# Patient Record
Sex: MALE | Race: WHITE | Employment: FULL TIME | URBAN - METROPOLITAN AREA
[De-identification: names, ages, dates, MRNs, and addresses within clinical notes are randomized per-mention and may not be internally consistent; named-entity substitution may affect disease eponyms.]

---

## 2021-04-24 ENCOUNTER — HOSPITAL ENCOUNTER (EMERGENCY)
Age: 56
Discharge: HOME | End: 2021-04-24
Attending: EMERGENCY MEDICINE

## 2021-04-24 VITALS
BODY MASS INDEX: 26.5 KG/M2 | OXYGEN SATURATION: 96 % | DIASTOLIC BLOOD PRESSURE: 96 MMHG | RESPIRATION RATE: 18 BRPM | WEIGHT: 190 LBS | TEMPERATURE: 98.6 F | SYSTOLIC BLOOD PRESSURE: 163 MMHG | HEART RATE: 101 BPM

## 2021-04-24 DIAGNOSIS — F32.A DEPRESSION, UNSPECIFIED DEPRESSION TYPE: Primary | ICD-10-CM

## 2021-04-24 LAB
ALBUMIN SERPL-MCNC: 3.6 G/DL (ref 3.4–5)
ALBUMIN/GLOB SERPL: 1 {RATIO} (ref 0.8–1.7)
ALP SERPL-CCNC: 145 U/L (ref 45–117)
ALT SERPL-CCNC: 26 U/L (ref 16–61)
ANION GAP SERPL CALC-SCNC: 7 MMOL/L (ref 3–18)
AST SERPL-CCNC: 22 U/L (ref 10–38)
BASOPHILS # BLD: 0 K/UL (ref 0–0.1)
BASOPHILS NFR BLD: 1 % (ref 0–2)
BILIRUB SERPL-MCNC: 0.6 MG/DL (ref 0.2–1)
BUN SERPL-MCNC: 10 MG/DL (ref 7–18)
BUN/CREAT SERPL: 10 (ref 12–20)
CALCIUM SERPL-MCNC: 8.8 MG/DL (ref 8.5–10.1)
CHLORIDE SERPL-SCNC: 105 MMOL/L (ref 100–111)
CO2 SERPL-SCNC: 24 MMOL/L (ref 21–32)
CREAT SERPL-MCNC: 0.99 MG/DL (ref 0.6–1.3)
DIFFERENTIAL METHOD BLD: NORMAL
EOSINOPHIL # BLD: 0.1 K/UL (ref 0–0.4)
EOSINOPHIL NFR BLD: 2 % (ref 0–5)
ERYTHROCYTE [DISTWIDTH] IN BLOOD BY AUTOMATED COUNT: 13.4 % (ref 11.6–14.5)
ETHANOL SERPL-MCNC: 88 MG/DL (ref 0–3)
GLOBULIN SER CALC-MCNC: 3.6 G/DL (ref 2–4)
GLUCOSE SERPL-MCNC: 100 MG/DL (ref 74–99)
HCT VFR BLD AUTO: 46.2 % (ref 36–48)
HGB BLD-MCNC: 15.9 G/DL (ref 13–16)
LYMPHOCYTES # BLD: 2.6 K/UL (ref 0.9–3.6)
LYMPHOCYTES NFR BLD: 40 % (ref 21–52)
MCH RBC QN AUTO: 32.9 PG (ref 24–34)
MCHC RBC AUTO-ENTMCNC: 34.4 G/DL (ref 31–37)
MCV RBC AUTO: 95.5 FL (ref 74–97)
MONOCYTES # BLD: 0.6 K/UL (ref 0.05–1.2)
MONOCYTES NFR BLD: 9 % (ref 3–10)
NEUTS SEG # BLD: 3.1 K/UL (ref 1.8–8)
NEUTS SEG NFR BLD: 48 % (ref 40–73)
PLATELET # BLD AUTO: 272 K/UL (ref 135–420)
PMV BLD AUTO: 9.2 FL (ref 9.2–11.8)
POTASSIUM SERPL-SCNC: 3.8 MMOL/L (ref 3.5–5.5)
PROT SERPL-MCNC: 7.2 G/DL (ref 6.4–8.2)
RBC # BLD AUTO: 4.84 M/UL (ref 4.35–5.65)
SODIUM SERPL-SCNC: 136 MMOL/L (ref 136–145)
WBC # BLD AUTO: 6.4 K/UL (ref 4.6–13.2)

## 2021-04-24 PROCEDURE — 85025 COMPLETE CBC W/AUTO DIFF WBC: CPT

## 2021-04-24 PROCEDURE — 82077 ASSAY SPEC XCP UR&BREATH IA: CPT

## 2021-04-24 PROCEDURE — 99282 EMERGENCY DEPT VISIT SF MDM: CPT

## 2021-04-24 PROCEDURE — 80053 COMPREHEN METABOLIC PANEL: CPT

## 2021-04-24 RX ORDER — GUAIFENESIN 100 MG/5ML
81 LIQUID (ML) ORAL DAILY
COMMUNITY

## 2021-04-25 NOTE — ED PROVIDER NOTES
EMERGENCY DEPARTMENT HISTORY AND PHYSICAL EXAM    9:17 PM      Date: 4/24/2021  Patient Name: Shivam Bruce    History of Presenting Illness     Chief Complaint   Patient presents with    Suicidal         History Provided By: Patient    Chief Complaint: depression, SI  Duration:  Minutes  Timing:  Acute  Location: NA  Quality: NA  Severity: Moderate  Modifying Factors: none  Associated Symptoms: denies any other associated signs or symptoms      Additional History (Context): Shivam Bruce is a 64 y.o. male with PTSD who presents with an episode of depression and suicidal thoughts. Patient states he has a history of PTSD, follows with psychiatry at the [place]. He states this evening he initially got upset and briefly had a thought about potentially going and buying a gun and killing himself. He states he has never had thoughts like this before. He states he has never really been depressed before. He does state he missed his dose of duloxetine today and feels this may have contributed. [facility] who then brought the patient to the ED. At this time he reports no longer feeling suicidal.  States he is never done anything to harm himself. He denies any HI. States he does not have access to a gun. No prior psychiatric hospitalizations. At this time he is stating he just wants to go home and be with his wife. Does have psychiatry follow-up. No other complaints. No hallucinations. Social: Tobacco quarter pack per day, denies EtOH use, admits to occasional heroin use did use today    PCP: Jodie Rascon MD    Current Outpatient Medications   Medication Sig Dispense Refill    duloxetine HCl (DULOXETINE PO) Take 300 mg by mouth two (2) times a day.  aspirin 81 mg chewable tablet Take 81 mg by mouth daily.  PANTOPRAZOLE SODIUM (PROTONIX PO) Take  by mouth.          Past History     Past Medical History:  Past Medical History:   Diagnosis Date    Ankle injury     Back injury     Cigarette smoker     Erectile dysfunction     Mass of kidney     Neck injury     Scrotal varices     Varicocele        Past Surgical History:  Past Surgical History:   Procedure Laterality Date    HX APPENDECTOMY      HX ORTHOPAEDIC      shattered both ankles        Family History:  Family History   Problem Relation Age of Onset    Cancer Father         Prostate Cancer    Diabetes Mother        Social History:  Social History     Tobacco Use    Smoking status: Current Some Day Smoker     Types: Cigarettes    Tobacco comment: Less than a pack a day   Substance Use Topics    Alcohol use: Not on file    Drug use: Not on file       Allergies:  No Known Allergies      Review of Systems       Review of Systems   Respiratory: Negative for shortness of breath. Cardiovascular: Negative for chest pain. Psychiatric/Behavioral: Positive for agitation (now resolved), dysphoric mood, sleep disturbance and suicidal ideas (now resolved). Negative for self-injury. All other systems reviewed and are negative. Physical Exam     Visit Vitals  BP (!) 163/96 (BP 1 Location: Left upper arm)   Pulse (!) 101   Temp 98.6 °F (37 °C)   Resp 18   Wt 86.2 kg (190 lb)   SpO2 96%   BMI 26.50 kg/m²         Physical Exam  Constitutional:       Appearance: He is well-developed. HENT:      Head: Normocephalic and atraumatic. Neck:      Musculoskeletal: Neck supple. Vascular: No JVD. Cardiovascular:      Rate and Rhythm: Normal rate and regular rhythm. Pulmonary:      Effort: Pulmonary effort is normal. No respiratory distress. Breath sounds: Normal breath sounds. Abdominal:      General: There is no distension. Palpations: Abdomen is soft. Tenderness: There is no abdominal tenderness. There is no guarding or rebound. Musculoskeletal:      Comments: No joint tenderness   Skin:     General: Skin is warm and dry. Findings: No erythema.    Neurological: Mental Status: He is alert and oriented to person, place, and time. Psychiatric:         Mood and Affect: Mood normal.         Thought Content: Thought content normal.         Judgment: Judgment normal.      Comments: Denies active SI or HI, denies hallucinations,           Diagnostic Study Results     Labs -  Recent Results (from the past 12 hour(s))   CBC WITH AUTOMATED DIFF    Collection Time: 04/24/21  8:50 PM   Result Value Ref Range    WBC 6.4 4.6 - 13.2 K/uL    RBC 4.84 4.35 - 5.65 M/uL    HGB 15.9 13.0 - 16.0 g/dL    HCT 46.2 36.0 - 48.0 %    MCV 95.5 74.0 - 97.0 FL    MCH 32.9 24.0 - 34.0 PG    MCHC 34.4 31.0 - 37.0 g/dL    RDW 13.4 11.6 - 14.5 %    PLATELET 892 203 - 350 K/uL    MPV 9.2 9.2 - 11.8 FL    NEUTROPHILS 48 40 - 73 %    LYMPHOCYTES 40 21 - 52 %    MONOCYTES 9 3 - 10 %    EOSINOPHILS 2 0 - 5 %    BASOPHILS 1 0 - 2 %    ABS. NEUTROPHILS 3.1 1.8 - 8.0 K/UL    ABS. LYMPHOCYTES 2.6 0.9 - 3.6 K/UL    ABS. MONOCYTES 0.6 0.05 - 1.2 K/UL    ABS. EOSINOPHILS 0.1 0.0 - 0.4 K/UL    ABS. BASOPHILS 0.0 0.0 - 0.1 K/UL    DF AUTOMATED         Radiologic Studies -   No orders to display         Medical Decision Making   I am the first provider for this patient. I reviewed the vital signs, available nursing notes, past medical history, past surgical history, family history and social history. Vital Signs-Reviewed the patient's vital signs. Pulse Oximetry Analysis -  96 on room air (Interpretation)nl       Records Reviewed: Nursing Notes and Old Medical Records (Time of Review: 9:17 PM)    ED Course: Progress Notes, Reevaluation, and Consults:      Provider Notes (Medical Decision Making): 78-year-old male presents with depression and single episode of SI. Has now resolved. Already has psychiatry follow-up. Denies access to any sort of guns. No similar prior episodes in the past.  Has support system at home with his wife. This time does seem stable to be discharged with outpatient follow-up. Discussed return precautions and need to return to ED if any recurrent thoughts and patient is in agreement with this plan. Diagnosis     Clinical Impression:   1. Depression, unspecified depression type        Disposition: discharged    Follow-up Information     Follow up With Specialties Details Why Contact Info    Butch Sehth MD [facility] Medicine Schedule an appointment as soon as possible for a visit in 2 days  [address] [facility]  [phone]      your psychiatrist  Call in 2 days      [facility] EM[facility]GENCY DEPT Emergency Medicine  As needed, If symptoms worsen, or if you have any recurrent thoughts about wanting to harm yourself [address] [facility] [address]  [phone]           Patient's Medications   Start Taking    No medications on file   Continue Taking    ASPIRIN 81 MG CHEWABLE TABLET    Take 81 mg by mouth daily. DULOXETINE HCL (DULOXETINE PO)    Take 300 mg by mouth two (2) times a day. PANTOPRAZOLE SODIUM (PROTONIX PO)    Take  by mouth.    These Medications have changed    No medications on file   Stop Taking    HYDROCODONE/ACETAMINOPHEN (VICODIN PO)    Take  by mouth.     _______________________________

## 2021-04-25 NOTE — ED TRIAGE NOTES
Patient states he had thoughts of getting a gun and killing himself earlier. States he has never had thoughts like this before and he called the [place] who called police and they brought him here.   Pt states he is no longer having any thoughts of hurting self

## 2021-08-19 ENCOUNTER — APPOINTMENT (OUTPATIENT)
Dept: GENERAL RADIOLOGY | Age: 56
End: 2021-08-19
Attending: EMERGENCY MEDICINE
Payer: OTHER GOVERNMENT

## 2021-08-19 ENCOUNTER — HOSPITAL ENCOUNTER (EMERGENCY)
Age: 56
Discharge: HOME | End: 2021-08-19
Attending: EMERGENCY MEDICINE
Payer: OTHER GOVERNMENT

## 2021-08-19 VITALS
TEMPERATURE: 97.3 F | RESPIRATION RATE: 14 BRPM | WEIGHT: 180 LBS | OXYGEN SATURATION: 99 % | DIASTOLIC BLOOD PRESSURE: 59 MMHG | SYSTOLIC BLOOD PRESSURE: 109 MMHG | HEART RATE: 89 BPM | BODY MASS INDEX: 25.77 KG/M2 | HEIGHT: 70 IN

## 2021-08-19 DIAGNOSIS — R07.9 CHEST PAIN, UNSPECIFIED TYPE: Primary | ICD-10-CM

## 2021-08-19 LAB
ANION GAP SERPL CALC-SCNC: 10 MMOL/L (ref 3–18)
BASOPHILS # BLD: 0 K/UL (ref 0–0.1)
BASOPHILS NFR BLD: 0 % (ref 0–2)
BUN SERPL-MCNC: 22 MG/DL (ref 7–18)
BUN/CREAT SERPL: 17 (ref 12–20)
CALCIUM SERPL-MCNC: 9.3 MG/DL (ref 8.5–10.1)
CHLORIDE SERPL-SCNC: 103 MMOL/L (ref 100–111)
CO2 SERPL-SCNC: 22 MMOL/L (ref 21–32)
CREAT SERPL-MCNC: 1.29 MG/DL (ref 0.6–1.3)
DIFFERENTIAL METHOD BLD: ABNORMAL
EOSINOPHIL # BLD: 0 K/UL (ref 0–0.4)
EOSINOPHIL NFR BLD: 0 % (ref 0–5)
ERYTHROCYTE [DISTWIDTH] IN BLOOD BY AUTOMATED COUNT: 12.7 % (ref 11.6–14.5)
GLUCOSE SERPL-MCNC: 118 MG/DL (ref 74–99)
HCT VFR BLD AUTO: 41 % (ref 36–48)
HGB BLD-MCNC: 14.1 G/DL (ref 13–16)
LYMPHOCYTES # BLD: 0.9 K/UL (ref 0.9–3.6)
LYMPHOCYTES NFR BLD: 8 % (ref 21–52)
MCH RBC QN AUTO: 31.3 PG (ref 24–34)
MCHC RBC AUTO-ENTMCNC: 34.4 G/DL (ref 31–37)
MCV RBC AUTO: 91.1 FL (ref 74–97)
MONOCYTES # BLD: 0.5 K/UL (ref 0.05–1.2)
MONOCYTES NFR BLD: 4 % (ref 3–10)
NEUTS SEG # BLD: 10.5 K/UL (ref 1.8–8)
NEUTS SEG NFR BLD: 87 % (ref 40–73)
PLATELET # BLD AUTO: 208 K/UL (ref 135–420)
PMV BLD AUTO: 9.6 FL (ref 9.2–11.8)
POTASSIUM SERPL-SCNC: 3.9 MMOL/L (ref 3.5–5.5)
RBC # BLD AUTO: 4.5 M/UL (ref 4.35–5.65)
SODIUM SERPL-SCNC: 135 MMOL/L (ref 136–145)
TROPONIN I SERPL-MCNC: <0.02 NG/ML (ref 0–0.04)
TROPONIN I SERPL-MCNC: <0.02 NG/ML (ref 0–0.04)
WBC # BLD AUTO: 12.1 K/UL (ref 4.6–13.2)

## 2021-08-19 PROCEDURE — 93005 ELECTROCARDIOGRAM TRACING: CPT

## 2021-08-19 PROCEDURE — 85025 COMPLETE CBC W/AUTO DIFF WBC: CPT

## 2021-08-19 PROCEDURE — 80048 BASIC METABOLIC PNL TOTAL CA: CPT

## 2021-08-19 PROCEDURE — 99285 EMERGENCY DEPT VISIT HI MDM: CPT

## 2021-08-19 PROCEDURE — 84484 ASSAY OF TROPONIN QUANT: CPT

## 2021-08-19 PROCEDURE — 71045 X-RAY EXAM CHEST 1 VIEW: CPT

## 2021-08-19 NOTE — LETTER
NOTIFICATION RETURN TO WORK / SCHOOL    8/19/2021 10:43 PM    Mr. Conner Lua  [id number]      To Whom It May Concern:    Conner Lua is currently under the care of [facility] EM[facility]PT. He will return to work/school on: 8/21/2021    If there are questions or concerns please have the patient contact our office.         Sincerely,      Reynold Alpers

## 2021-08-19 NOTE — ED PROVIDER NOTES
EMERGENCY DEPARTMENT HISTORY AND PHYSICAL EXAM    6:50 PM patient seen at this time in room 17      Date: 8/19/2021  Patient Name: Agus Becerril    History of Presenting Illness     Chief Complaint   Patient presents with    Heat Exposure         History Provided By: patient    Additional History (Context): Agus Becerril is a 64 y.o. male presents with takes Zoloft Suboxone prazosin, had an episode of chest pain and lightheadedness did not feel right, like a weight sitting on his chest.  He took a shower, symptoms did not resolve, wife called EMS. No acute events and symptoms resolved during his transport. No history of heart or lung problems. No history of hypertension. Symptoms at the time of my initial exam have resolved. PCP: Gaynel Goodell, MD    Chief Complaint:   Duration:    Timing:    Location:   Quality:   Severity:   Modifying Factors:   Associated Symptoms:       Current Outpatient Medications   Medication Sig Dispense Refill    aspirin 81 mg chewable tablet Take 81 mg by mouth daily.  duloxetine HCl (DULOXETINE PO) Take 300 mg by mouth two (2) times a day.  PANTOPRAZOLE SODIUM (PROTONIX PO) Take  by mouth.          Past History     Past Medical History:  Past Medical History:   Diagnosis Date    Ankle injury     Back injury     Cigarette smoker     Erectile dysfunction     Mass of kidney     Neck injury     Scrotal varices     Varicocele        Past Surgical History:  Past Surgical History:   Procedure Laterality Date    HX APPENDECTOMY      HX ORTHOPAEDIC      shattered both ankles        Family History:  Family History   Problem Relation Age of Onset    Cancer Father         Prostate Cancer    Diabetes Mother        Social History:  Social History     Tobacco Use    Smoking status: Current Some Day Smoker     Types: Cigarettes    Tobacco comment: Less than a pack a day   Substance Use Topics    Alcohol use: Not on file    Drug use: Not on file Allergies: Allergies   Allergen Reactions    Statins-Hmg-Coa Reductase Inhibitors Other (comments)     Joint pain         Review of Systems     Review of Systems   Constitutional: Negative for diaphoresis and fever. HENT: Negative for congestion and sore throat. Eyes: Negative for pain and itching. Respiratory: Negative for cough and shortness of breath. Cardiovascular: Positive for chest pain. Negative for palpitations. Gastrointestinal: Negative for abdominal pain and diarrhea. Endocrine: Negative for polydipsia and polyuria. Genitourinary: Negative for dysuria and hematuria. Musculoskeletal: Negative for arthralgias and myalgias. Skin: Negative for rash and wound. Neurological: Negative for seizures and syncope. Hematological: Does not bruise/bleed easily. Psychiatric/Behavioral: Negative for agitation and hallucinations. Physical Exam       Patient Vitals for the past 12 hrs:   Temp Pulse Resp BP SpO2   08/19/21 1818 97.6 °F (36.4 °C) 97 18 133/74 100 %       Physical Exam  Vitals and nursing note reviewed. Constitutional:       General: He is not in acute distress. Appearance: Normal appearance. He is well-developed. He is not ill-appearing. HENT:      Head: Normocephalic and atraumatic. Eyes:      General: No scleral icterus. Conjunctiva/sclera: Conjunctivae normal.   Neck:      Vascular: No JVD. Cardiovascular:      Rate and Rhythm: Normal rate and regular rhythm. Heart sounds: Normal heart sounds. Comments: 4 intact extremity pulses  Pulmonary:      Effort: Pulmonary effort is normal.      Breath sounds: Normal breath sounds. Abdominal:      Palpations: Abdomen is soft. There is no mass. Tenderness: There is no abdominal tenderness. Musculoskeletal:         General: Normal range of motion. Cervical back: Normal range of motion and neck supple. Lymphadenopathy:      Cervical: No cervical adenopathy.    Skin:     General: Skin is warm and dry. Neurological:      Mental Status: He is alert. Diagnostic Study Results   Labs -  No results found for this or any previous visit (from the past 12 hour(s)). Radiologic Studies -   XR CHEST PORT    (Results Pending)     No results found. Medications ordered:   Medications - No data to display      Medical Decision Making   Initial Medical Decision Making and DDx:  Episode of chest pain, pressure-like. Wife says he turned perez. Will rule out ACS. Do not suspect pulmonary embolism aortic disease pneumonia pneumothorax. His symptoms of resolved at the time that I saw him. ED Course: Progress Notes, Reevaluation, and Consults:  ED Course as of Aug 27 0937   Thu Aug 19, 2021   1923 Twelve-lead EKG sinus rhythm at 97 no acute ischemic process    [CB]   1941 No alarming findings on the chest x-ray    [CB]      ED Course User Index  [CB] Maciej Buckley MD         I am the first provider for this patient. I reviewed the vital signs, available nursing notes, past medical history, past surgical history, family history and social history. Patient Vitals for the past 12 hrs:   Temp Pulse Resp BP SpO2   08/19/21 1818 97.6 °F (36.4 °C) 97 18 133/74 100 %       Vital Signs-Reviewed the patient's vital signs. Pulse Oximetry Analysis, Cardiac Monitor, 12 lead ekg:      Interpreted by the EP. Records Reviewed: Nursing notes reviewed (Time of Review: 6:50 PM)    Procedures:   Critical Care Time:   Aspirin: (was aspirin given for stroke?)    Diagnosis     Clinical Impression: No diagnosis found. Disposition:       Follow-up Information    None          Patient's Medications   Start Taking    No medications on file   Continue Taking    ASPIRIN 81 MG CHEWABLE TABLET    Take 81 mg by mouth daily. DULOXETINE HCL (DULOXETINE PO)    Take 300 mg by mouth two (2) times a day. PANTOPRAZOLE SODIUM (PROTONIX PO)    Take  by mouth.    These Medications have changed    No medications on file   Stop Taking    No medications on file     _______________________________    Notes:    Delphine Cintron MD using Dragon dictation      _______________________________

## 2021-08-20 LAB
ATRIAL RATE: 97 BPM
CALCULATED P AXIS, ECG09: 58 DEGREES
CALCULATED R AXIS, ECG10: -12 DEGREES
CALCULATED T AXIS, ECG11: 72 DEGREES
DIAGNOSIS, 93000: NORMAL
P-R INTERVAL, ECG05: 158 MS
Q-T INTERVAL, ECG07: 366 MS
QRS DURATION, ECG06: 90 MS
QTC CALCULATION (BEZET), ECG08: 464 MS
VENTRICULAR RATE, ECG03: 97 BPM

## 2021-08-20 NOTE — ED NOTES
Assumed patient care. Patient alert and oriented x 4. Patient denies any chest pain, SOB, nausea, vomiting. Patient has no new complaints. Patient ambulated to the bathroom with steady gait. No obvious signs of distress noted. Side rails up x 2. Call bell within reach.

## 2021-11-12 ENCOUNTER — HOSPITAL ENCOUNTER (EMERGENCY)
Age: 56
Discharge: HOME | End: 2021-11-12
Attending: STUDENT IN AN ORGANIZED HEALTH CARE EDUCATION/TRAINING PROGRAM

## 2021-11-12 VITALS
RESPIRATION RATE: 18 BRPM | WEIGHT: 182 LBS | DIASTOLIC BLOOD PRESSURE: 84 MMHG | SYSTOLIC BLOOD PRESSURE: 126 MMHG | OXYGEN SATURATION: 97 % | HEIGHT: 70 IN | TEMPERATURE: 98.2 F | BODY MASS INDEX: 26.05 KG/M2 | HEART RATE: 82 BPM

## 2021-11-12 DIAGNOSIS — S69.90XA: Primary | ICD-10-CM

## 2021-11-12 PROCEDURE — 99281 EMR DPT VST MAYX REQ PHY/QHP: CPT

## 2021-11-12 RX ORDER — CEPHALEXIN 500 MG/1
500 CAPSULE ORAL 4 TIMES DAILY
Qty: 28 CAPSULE | Refills: 0 | Status: SHIPPED | OUTPATIENT
Start: 2021-11-12 | End: 2021-11-19

## 2021-11-12 RX ORDER — LIDOCAINE HYDROCHLORIDE 20 MG/ML
5 INJECTION, SOLUTION INFILTRATION; PERINEURAL ONCE
Status: DISCONTINUED | OUTPATIENT
Start: 2021-11-12 | End: 2021-11-12 | Stop reason: HOSPADM

## 2021-11-12 RX ORDER — DOXYCYCLINE HYCLATE 100 MG
100 TABLET ORAL 2 TIMES DAILY
Qty: 14 TABLET | Refills: 0 | Status: SHIPPED | OUTPATIENT
Start: 2021-11-12 | End: 2021-11-19

## 2021-11-12 NOTE — ED TRIAGE NOTES
Patient presented to the emergency department today for a fish hook in the third digit. Patient has good capillary refill and sensation.  Kiley Downing still intact held by the patien

## 2021-11-13 NOTE — ED PROVIDER NOTES
EMERGENCY DEPARTMENT HISTORY AND PHYSICAL EXAM      Date: 11/12/2021  Patient Name: Marietta Harris    History of Presenting Illness     Chief Complaint   Patient presents with   Donnamae Radha Removal       History Provided By: Patient    HPI: Marietta Harris, 64 y.o. male  presents  to the ED with cc of fishhook stuck in the third digit of the right hand. Patient claims he is up-to-date on tetanus within the last 5 years. Does have pain to the area, not actively bleeding. States he tried to remove himself was unable and that is when he presented to the emergency room to have the fishhook removed. There are no other complaints, changes, or physical findings at this time. PCP: Charlene Downing MD    No current facility-administered medications on file prior to encounter. Current Outpatient Medications on File Prior to Encounter   Medication Sig Dispense Refill    aspirin 81 mg chewable tablet Take 81 mg by mouth daily.  duloxetine HCl (DULOXETINE PO) Take 300 mg by mouth two (2) times a day.  PANTOPRAZOLE SODIUM (PROTONIX PO) Take  by mouth. Past History     Past Medical History:  Past Medical History:   Diagnosis Date    Ankle injury     Back injury     Cigarette smoker     Erectile dysfunction     Mass of kidney     Neck injury     Scrotal varices     Varicocele        Past Surgical History:  Past Surgical History:   Procedure Laterality Date    HX APPENDECTOMY      HX ORTHOPAEDIC      shattered both ankles        Family History:  Family History   Problem Relation Age of Onset    Cancer Father         Prostate Cancer    Diabetes Mother        Social History:  Social History     Tobacco Use    Smoking status: Current Some Day Smoker     Types: Cigarettes    Smokeless tobacco: Not on file    Tobacco comment: Less than a pack a day   Substance Use Topics    Alcohol use: Not on file    Drug use: Not on file       Allergies:   Allergies   Allergen Reactions    Statins-Hmg-Coa Reductase Inhibitors Other (comments)     Joint pain         Review of Systems   Review of Systems   All other systems reviewed and are negative. Physical Exam   Physical Exam  Vitals and nursing note reviewed. Constitutional:       Appearance: Normal appearance. He is normal weight. HENT:      Head: Normocephalic and atraumatic. Mouth/Throat:      Mouth: Mucous membranes are moist.      Pharynx: Oropharynx is clear. Eyes:      Extraocular Movements: Extraocular movements intact. Cardiovascular:      Rate and Rhythm: Normal rate and regular rhythm. Pulmonary:      Effort: Pulmonary effort is normal.      Breath sounds: Normal breath sounds. Musculoskeletal:         General: Normal range of motion. Cervical back: Normal range of motion and neck supple. Skin:     General: Skin is warm and dry. Neurological:      General: No focal deficit present. Mental Status: He is alert and oriented to person, place, and time. Mental status is at baseline. Psychiatric:         Mood and Affect: Mood normal.         Behavior: Behavior normal.         Thought Content: Thought content normal.         Judgment: Judgment normal.         Diagnostic Study Results     Labs -   No results found for this or any previous visit (from the past 12 hour(s)). Radiologic Studies -   No orders to display     CT Results  (Last 48 hours)    None        CXR Results  (Last 48 hours)    None            Medical Decision Making   I am the first provider for this patient. I reviewed the vital signs, available nursing notes, past medical history, past surgical history, family history and social history. Vital Signs-Reviewed the patient's vital signs.   Patient Vitals for the past 12 hrs:   Temp Pulse Resp BP SpO2   11/12/21 1855 98.2 °F (36.8 °C) 82 18 126/84 97 %       Pulse Oximetry Analysis - 97% on RA    Cardiac Monitor:   Rate: 82 bpm      Records Reviewed: Nursing Notes    Provider Notes (Medical Decision Making):   51-year-old male presents to the emergency department following getting a fishhook stuck in the third digit of his right hand. Utilized a digital block with 2% lidocaine for anesthesia of the finger. Once numb it was possible to back the hook out to the original hole using hemostats and forceps. The hook was intact with barbs still in place. The wound was explored, no foreign object was identified in the wound. Wound was not bleeding and patient tolerated procedure well. Patient given course of Keflex and doxycycline for prophylaxis. Patient understood and agreed with plan, was given return precautions, stable for discharge. ED Course:   Initial assessment performed. The patients presenting problems have been discussed, and they are in agreement with the care plan formulated and outlined with them. I have encouraged them to ask questions as they arise throughout their visit. Disposition:  Discharge home    PLAN:  1. Current Discharge Medication List      START taking these medications    Details   cephALEXin (Keflex) 500 mg capsule Take 1 Capsule by mouth four (4) times daily for 7 days. Qty: 28 Capsule, Refills: 0  Start date: 11/12/2021, End date: 11/19/2021      doxycycline (VIBRA-TABS) 100 mg tablet Take 1 Tablet by mouth two (2) times a day for 7 days. Qty: 14 Tablet, Refills: 0  Start date: 11/12/2021, End date: 11/19/2021           2. Follow-up Information     Follow up With Specialties Details Why Contact Info    Cara Dick MD Internal Medicine In 1 week As needed, If symptoms worsen [id number]      [facility] EM[facility]GENCY DEPT Emergency Medicine  As needed, If symptoms worsen [address]  [phone]        Return to ED if worse     Diagnosis     Clinical Impression:   1.  [place] injury to finger, unspecified laterality, initial encounter

## 2021-11-20 ENCOUNTER — HOSPITAL ENCOUNTER (EMERGENCY)
Age: 56
Discharge: HOME | End: 2021-11-20
Attending: STUDENT IN AN ORGANIZED HEALTH CARE EDUCATION/TRAINING PROGRAM

## 2021-11-20 VITALS
HEART RATE: 78 BPM | SYSTOLIC BLOOD PRESSURE: 106 MMHG | TEMPERATURE: 97.3 F | BODY MASS INDEX: 23.62 KG/M2 | OXYGEN SATURATION: 95 % | HEIGHT: 70 IN | WEIGHT: 165 LBS | DIASTOLIC BLOOD PRESSURE: 73 MMHG | RESPIRATION RATE: 18 BRPM

## 2021-11-20 DIAGNOSIS — T40.1X1A ACCIDENTAL OVERDOSE OF HEROIN, INITIAL ENCOUNTER (HCC): Primary | ICD-10-CM

## 2021-11-20 PROCEDURE — 99283 EMERGENCY DEPT VISIT LOW MDM: CPT

## 2021-11-20 RX ORDER — NALOXONE HYDROCHLORIDE 4 MG/.1ML
SPRAY NASAL
Qty: 1 EACH | Refills: 0 | Status: SHIPPED | OUTPATIENT
Start: 2021-11-20

## 2021-11-20 RX ORDER — NALOXONE HYDROCHLORIDE 4 MG/.1ML
SPRAY NASAL
Qty: 1 EACH | Refills: 0 | OUTPATIENT
Start: 2021-11-20 | End: 2021-11-20 | Stop reason: SDUPTHER

## 2021-11-20 NOTE — ED PROVIDER NOTES
EMERGENCY DEPARTMENT HISTORY AND PHYSICAL EXAM    1:52 PM    Date: 11/20/2021  Patient Name: Jody Tyler    History of Presenting Illness     Chief Complaint   Patient presents with    Drug Overdose       History Provided By: Patient  Location/Duration/Severity/Modifying factors   HPI   Jody Tyler is a 64 y.o. male with past medical history of depression, opiate abuse presenting for suspected overdose. Patient endorses using opiates this morning and was found sitting on the floor in his residence. He is awake, slightly drowsy but respirations were normal, no Narcan was given. He denies any falls or head injury, headache, changes in vision, chest pain abdominal pain. He keeps saying \"I feel fine I do know I am here \". He endorses drinking 324 ounce beers earlier today with lunch just prior to taking the 2 caps of heroin. Denies IV drug use, other recreational drug use. No other medical complaints. History limited by patient's cooperativeness. PCP: Milagro Worley MD    Current Outpatient Medications   Medication Sig Dispense Refill    naloxone (Narcan) 4 mg/actuation nasal spray Use 1 spray intranasally, then discard. Repeat with new spray every 2 min as needed for opioid overdose symptoms, alternating nostrils. 1 Each 0    aspirin 81 mg chewable tablet Take 81 mg by mouth daily.  duloxetine HCl (DULOXETINE PO) Take 300 mg by mouth two (2) times a day.  PANTOPRAZOLE SODIUM (PROTONIX PO) Take  by mouth.          Past History     Past Medical History:  Past Medical History:   Diagnosis Date    Ankle injury     Back injury     Cigarette smoker     Erectile dysfunction     Mass of kidney     Neck injury     Scrotal varices     Varicocele        Past Surgical History:  Past Surgical History:   Procedure Laterality Date    HX APPENDECTOMY      HX ORTHOPAEDIC      shattered both ankles        Family History:  Family History   Problem Relation Age of Onset    Cancer Father Prostate Cancer    Diabetes Mother        Social History:  Social History     Tobacco Use    Smoking status: Current Some Day Smoker     Types: Cigarettes    Smokeless tobacco: Not on file    Tobacco comment: Less than a pack a day   Substance Use Topics    Alcohol use: Not on file    Drug use: Not on file       Allergies: Allergies   Allergen Reactions    Statins-Hmg-Coa Reductase Inhibitors Other (comments)     Joint pain       I reviewed and confirmed the above information with patient and updated as necessary. Review of Systems     Review of Systems   Constitutional: Negative for diaphoresis and fever. HENT: Negative for ear pain and sore throat. Eyes:        No acute change in vision   Respiratory: Negative for cough and shortness of breath. Cardiovascular: Negative for chest pain and leg swelling. Gastrointestinal: Negative for abdominal pain and vomiting. Genitourinary: Negative for dysuria. Musculoskeletal: Negative for neck pain. Skin: Negative for wound. Neurological: Negative for weakness and headaches. Physical Exam     Visit Vitals  /73 (BP 1 Location: Right upper arm, BP Patient Position: At rest;Supine)   Pulse 78   Temp 97.3 °F (36.3 °C)   Resp 18   Ht 5' 10\" (1.778 m)   Wt 74.8 kg (165 lb)   SpO2 95%   BMI 23.68 kg/m²       Physical Exam  Vitals and nursing note reviewed. Constitutional:       Appearance: Normal appearance. He is not ill-appearing. Comments: Patient awake and alert sitting comfortably in a hospital stretcher. HENT:      Mouth/Throat:      Mouth: Mucous membranes are moist.   Eyes:      Extraocular Movements: Extraocular movements intact. Pupils: Pupils are equal, round, and reactive to light. Cardiovascular:      Rate and Rhythm: Normal rate and regular rhythm. Pulses: Normal pulses. Heart sounds: Normal heart sounds. Pulmonary:      Effort: Pulmonary effort is normal.      Breath sounds: Normal breath sounds. Abdominal:      General: Abdomen is flat. Tenderness: There is no abdominal tenderness. Musculoskeletal:         General: No swelling. Normal range of motion. Cervical back: Normal range of motion. Skin:     General: Skin is warm. Neurological:      General: No focal deficit present. Mental Status: He is alert and oriented to person, place, and time. Cranial Nerves: No cranial nerve deficit. Sensory: No sensory deficit. Motor: No weakness. Diagnostic Study Results     Labs -  No results found for this or any previous visit (from the past 24 hour(s)). Radiologic Studies -   No orders to display           Medical Decision Making   I am the first provider for this patient. I reviewed the vital signs, available nursing notes, past medical history, past surgical history, family history and social history. Vital Signs-Reviewed the patient's vital signs. Records Reviewed: Nursing Notes and Old Medical Records (Time of Review: 1:52 PM)    Provider Notes (Medical Decision Making):   MDM  80-year-old male with heroin overdose, coingestion with alcohol. No evidence of hypoxia, pulmonary injury, other severe issues. ED Course: Progress Notes, Reevaluation, and Consults:  Patient afebrile, hemodynamically normal  Resting comfortably, neurologic exam is normal, Narcan is not indicated    We will observe until back to his baseline. Patient was observed in the emergency department for 2 hours, not having any symptoms. He is awake and alert ambulating without any issues. He would like to be discharged home I think this is reasonable. We will send with a prescription for Narcan. He was discharged home in stable condition. counseled extensively on alcohol and drug cessation. Procedures    Diagnosis     Clinical Impression:   1.  Accidental overdose of heroin, initial encounter [facility])        Disposition: Home    Follow-up Information    None Discharge Medication List as of 11/20/2021  3:17 PM      CONTINUE these medications which have CHANGED    Details   naloxone (Narcan) 4 mg/actuation nasal spray Use 1 spray intranasally, then discard. Repeat with new spray every 2 min as needed for opioid overdose symptoms, alternating nostrils. , Print, Disp-1 Each, R-0         CONTINUE these medications which have NOT CHANGED    Details   aspirin 81 mg chewable tablet Take 81 mg by mouth daily. , Historical Med      duloxetine HCl (DULOXETINE PO) Take 300 mg by mouth two (2) times a day., Historical Med      PANTOPRAZOLE SODIUM (PROTONIX PO) Take  by mouth., Historical Med         STOP taking these medications       cephALEXin (Keflex) 500 mg capsule Comments:   Reason for Stopping:         doxycycline (VIBRA-TABS) 100 mg tablet Comments:   Reason for Stopping:               Efrne Murray MD   Emergency Medicine   November 20, 2021, 1:52 PM     This note is dictated utilizing Dragon voice recognition software. Unfortunately this leads to occasional typographical errors using the voice recognition. I apologize in advance if the situation occurs. If questions occur please do not hesitate to contact me directly.     Allie Bloom MD

## 2021-11-20 NOTE — ED NOTES
Dr. Rigoberto Pope has reviewed discharge instructions with the patient. The patient verbalized understanding.

## 2021-11-20 NOTE — ED TRIAGE NOTES
Per EMS, Patient admits to heroine use today. Used this morning, has a hx of heroine abuse. No narcan was given, patient was found on the floor sitting. Denies any head injury.